# Patient Record
Sex: MALE | Race: WHITE | NOT HISPANIC OR LATINO | Employment: FULL TIME | URBAN - METROPOLITAN AREA
[De-identification: names, ages, dates, MRNs, and addresses within clinical notes are randomized per-mention and may not be internally consistent; named-entity substitution may affect disease eponyms.]

---

## 2023-07-18 ENCOUNTER — OFFICE VISIT (OUTPATIENT)
Dept: URGENT CARE | Facility: CLINIC | Age: 23
End: 2023-07-18
Payer: COMMERCIAL

## 2023-07-18 VITALS
BODY MASS INDEX: 21.66 KG/M2 | RESPIRATION RATE: 16 BRPM | TEMPERATURE: 98.3 F | HEIGHT: 67 IN | WEIGHT: 138 LBS | OXYGEN SATURATION: 96 % | HEART RATE: 85 BPM | DIASTOLIC BLOOD PRESSURE: 62 MMHG | SYSTOLIC BLOOD PRESSURE: 102 MMHG

## 2023-07-18 DIAGNOSIS — Z11.3 SCREEN FOR STD (SEXUALLY TRANSMITTED DISEASE): ICD-10-CM

## 2023-07-18 DIAGNOSIS — N48.9 PENILE LESION: ICD-10-CM

## 2023-07-18 DIAGNOSIS — N48.22 CELLULITIS, PENIS: ICD-10-CM

## 2023-07-18 PROCEDURE — 99204 OFFICE O/P NEW MOD 45 MIN: CPT | Performed by: NURSE PRACTITIONER

## 2023-07-18 PROCEDURE — 3078F DIAST BP <80 MM HG: CPT | Performed by: NURSE PRACTITIONER

## 2023-07-18 PROCEDURE — 3074F SYST BP LT 130 MM HG: CPT | Performed by: NURSE PRACTITIONER

## 2023-07-18 ASSESSMENT — ENCOUNTER SYMPTOMS
CONSTITUTIONAL NEGATIVE: 1
FEVER: 0
CHILLS: 0

## 2023-07-18 ASSESSMENT — VISUAL ACUITY: OU: 1

## 2023-07-18 NOTE — PROGRESS NOTES
Subjective:     Carlos Hart is a 23 y.o. male who presents for Wound Infection (On penis, now has grape size bumps near the area, and a rash.)       Wound Infection  This is a new problem. The problem has been gradually worsening. Associated symptoms include a rash. Pertinent negatives include no chills or fever.     2 weeks ago, patient developed pimple-like lesion with redness, swelling, and tenderness near the tip of his penis. He squeezed it and expressed purulent discharge. Eventually developed an ulceration and scabbing with dried yellow crusting. Improving overall with routine cleansing and topical antibiotic ointment. Was using a dressing to keep it covered, but then noticed red bumps/rash develop around the initial lesion where the adhesive came into contact. Unclear if related to the adhesive. Denies itching or burning. No vesicular lesions. Noted 2 small palpable bumps at his left groin. They are soft and mobile. Denies other symptoms. No dysuria or penile discharge.    Denies concern for STD. Reports monogamous relationship with single female partner who had recent negative STD testing.    Review of Systems   Constitutional: Negative.  Negative for chills, fever and malaise/fatigue.   Genitourinary: Negative.  Negative for dysuria.   Skin:  Positive for rash. Negative for itching.   All other systems reviewed and are negative.    Refer to HPI for additional details.    During this visit, appropriate PPE was worn, and hand hygiene was performed.    PMH:  has no past medical history on file.    MEDS:   Current Outpatient Medications:     mupirocin (BACTROBAN) 2 % Ointment, Apply 1 Application topically 3 times a day for 7 days., Disp: 22 g, Rfl: 0    ALLERGIES: No Known Allergies  SURGHX: History reviewed. No pertinent surgical history.  SOCHX:      FH: Per HPI as applicable/pertinent.      Objective:     /62 (BP Location: Left arm, Patient Position: Sitting, BP Cuff Size: Adult)   Pulse 85   Temp  "36.8 °C (98.3 °F) (Temporal)   Resp 16   Ht 1.702 m (5' 7\")   Wt 62.6 kg (138 lb)   SpO2 96%   BMI 21.61 kg/m²     Physical Exam  Nursing note reviewed.   Constitutional:       General: He is not in acute distress.     Appearance: He is well-developed. He is not ill-appearing or toxic-appearing.   Eyes:      General: Vision grossly intact.   Cardiovascular:      Rate and Rhythm: Normal rate.   Pulmonary:      Effort: Pulmonary effort is normal. No respiratory distress.   Genitourinary:     Penis: Lesions present.           Comments: Open wound/ulceration at left glans penis, with overlying/surrounding dried yellowish crusting; diffuse scattered erythematous papular lesions on glans, no vesicles or discharge  Musculoskeletal:         General: No deformity. Normal range of motion.   Lymphadenopathy:      Lower Body: Left inguinal adenopathy present.   Skin:     General: Skin is warm and dry.      Coloration: Skin is not pale.   Neurological:      Mental Status: He is alert and oriented to person, place, and time.      Motor: No weakness.   Psychiatric:         Behavior: Behavior normal. Behavior is cooperative.       Assessment/Plan:     1. Penile lesion  - Chlamydia/GC, PCR (Urine); Future  - HSV 1/2 IGM; Future  - HSV I SPECIFIC IGG AB; Future  - HSV II SPECIFIC IGG AB; Future  - T.PALLIDUM AB VIN (SCREENING); Future    2. Cellulitis, penis  - mupirocin (BACTROBAN) 2 % Ointment; Apply 1 Application topically 3 times a day for 7 days.  Dispense: 22 g; Refill: 0    3. Screen for STD (sexually transmitted disease)  - Chlamydia/GC, PCR (Urine); Future  - HSV 1/2 IGM; Future  - HSV I SPECIFIC IGG AB; Future  - HSV II SPECIFIC IGG AB; Future  - T.PALLIDUM AB VIN (SCREENING); Future    Rx as above sent electronically. Studies pending. Will contact patient.    Differential diagnosis, natural history, supportive care, over-the-counter symptom management per 's instructions, close monitoring, and indications " for immediate follow-up discussed.     All questions answered. Patient agrees with the plan of care.    Billing note: moderate complexity and moderate risk. New patient. [id number]. Please refer to LOS tool for details.

## 2023-10-08 ENCOUNTER — OFFICE VISIT (OUTPATIENT)
Dept: URGENT CARE | Facility: CLINIC | Age: 23
End: 2023-10-08
Payer: COMMERCIAL

## 2023-10-08 VITALS
WEIGHT: 138 LBS | SYSTOLIC BLOOD PRESSURE: 112 MMHG | TEMPERATURE: 97.3 F | BODY MASS INDEX: 21.66 KG/M2 | HEIGHT: 67 IN | HEART RATE: 106 BPM | OXYGEN SATURATION: 96 % | RESPIRATION RATE: 16 BRPM | DIASTOLIC BLOOD PRESSURE: 60 MMHG

## 2023-10-08 DIAGNOSIS — B36.9 FUNGAL RASH OF TRUNK: ICD-10-CM

## 2023-10-08 PROCEDURE — 99213 OFFICE O/P EST LOW 20 MIN: CPT | Performed by: FAMILY MEDICINE

## 2023-10-08 PROCEDURE — 3078F DIAST BP <80 MM HG: CPT | Performed by: FAMILY MEDICINE

## 2023-10-08 PROCEDURE — 3074F SYST BP LT 130 MM HG: CPT | Performed by: FAMILY MEDICINE

## 2023-10-08 ASSESSMENT — ENCOUNTER SYMPTOMS: FEVER: 0

## 2023-10-08 NOTE — PROGRESS NOTES
"Subjective:     Carlos Hart is a 23 y.o. male who presents for Rash (Not painful or itchy, started on arms 4 days ago. Is now spreading to the rest of the body. )    HPI  Pt presents for evaluation of an acute problem  Patient here for evaluation of rash for the past 4 days  Started on the arms and has been spreading  Has no pain or itching  Tried using hydrocortisone cream on the area without much improvement  Does not feel ill otherwise    Review of Systems   Constitutional:  Negative for fever.   Skin:  Positive for rash.       PMH:  has no past medical history on file.  MEDS:   Current Outpatient Medications:     KETOCONAZOLE, TOPICAL, 1 % Shampoo, Apply to affected area once daily, Disp: 200 mL, Rfl: 0  ALLERGIES: No Known Allergies  SURGHX: History reviewed. No pertinent surgical history.  SOCHX:       Objective:   /60 (BP Location: Left arm, Patient Position: Sitting, BP Cuff Size: Adult)   Pulse (!) 106   Temp 36.3 °C (97.3 °F) (Temporal)   Resp 16   Ht 1.702 m (5' 7\")   Wt 62.6 kg (138 lb)   SpO2 96%   BMI 21.61 kg/m²     Physical Exam  Constitutional:       General: He is not in acute distress.     Appearance: He is well-developed. He is not diaphoretic.   Pulmonary:      Effort: Pulmonary effort is normal.   Neurological:      Mental Status: He is alert.     Multiple areas of erythematous flaky patches which are circular with mildly irregular borders, slightly raised, and with no connecting erythema between lesions.  Patches are throughout the torso and arms.  No larger patch/herald patch    Assessment/Plan:   Assessment    1. Fungal rash of trunk  - KETOCONAZOLE, TOPICAL, 1 % Shampoo; Apply to affected area once daily  Dispense: 200 mL; Refill: 0    Patient with fungal rash.  Given ketoconazole shampoo to be used all over body daily in the shower.  If not resolving in the next 1 to 2 weeks, follow-up in urgent care.    "

## 2025-05-25 ENCOUNTER — OFFICE VISIT (OUTPATIENT)
Dept: URGENT CARE | Facility: CLINIC | Age: 25
End: 2025-05-25
Payer: COMMERCIAL

## 2025-05-25 VITALS
OXYGEN SATURATION: 97 % | RESPIRATION RATE: 16 BRPM | HEART RATE: 120 BPM | WEIGHT: 153.4 LBS | BODY MASS INDEX: 24.08 KG/M2 | DIASTOLIC BLOOD PRESSURE: 78 MMHG | TEMPERATURE: 98.5 F | SYSTOLIC BLOOD PRESSURE: 128 MMHG | HEIGHT: 67 IN

## 2025-05-25 DIAGNOSIS — R59.0 ANTERIOR CERVICAL LYMPHADENOPATHY: Primary | ICD-10-CM

## 2025-05-25 DIAGNOSIS — Z80.7 FAMILY HISTORY OF LYMPHOMA: ICD-10-CM

## 2025-05-25 DIAGNOSIS — J02.9 PHARYNGITIS, UNSPECIFIED ETIOLOGY: ICD-10-CM

## 2025-05-25 LAB
HETEROPH AB SER QL LA: NEGATIVE
POCT INT CON NEG: NEGATIVE
POCT INT CON POS: NEGATIVE
S PYO DNA SPEC NAA+PROBE: NOT DETECTED

## 2025-05-25 PROCEDURE — 87651 STREP A DNA AMP PROBE: CPT | Performed by: FAMILY MEDICINE

## 2025-05-25 PROCEDURE — 86308 HETEROPHILE ANTIBODY SCREEN: CPT | Performed by: FAMILY MEDICINE

## 2025-05-25 PROCEDURE — 3074F SYST BP LT 130 MM HG: CPT | Performed by: FAMILY MEDICINE

## 2025-05-25 PROCEDURE — 3078F DIAST BP <80 MM HG: CPT | Performed by: FAMILY MEDICINE

## 2025-05-25 PROCEDURE — 99214 OFFICE O/P EST MOD 30 MIN: CPT | Performed by: FAMILY MEDICINE

## 2025-05-25 ASSESSMENT — ENCOUNTER SYMPTOMS
VOMITING: 0
COUGH: 0
CHILLS: 0
SHORTNESS OF BREATH: 0
NAUSEA: 0
MYALGIAS: 0
FEVER: 0
DIZZINESS: 0
SWOLLEN GLANDS: 1
EYE REDNESS: 0
EYE DISCHARGE: 0
HOARSE VOICE: 0
SORE THROAT: 0

## 2025-05-25 NOTE — PROGRESS NOTES
Subjective:   Carlos Hart is a 25 y.o. male who presents for Adenopathy (On and off for a year his lymph nodes swell and then go way but for the last month it hasnt)        25-year-old presents urgent care with reports of anterior neck lymph node swelling intermittently over the past year and minimally improving over the past month.  Reports history of chronic and intermittent allergic rhinitis and reports chronic congestion and intermittent sore throat.  Denies fevers chills or sweats.  Reports currently not being established with a primary care provider and is amenable to establishing with a primary care provider.  The patient reports history of lymphoma in father.    Pharyngitis   This is a recurrent problem. The current episode started more than 1 month ago. The problem has been waxing and waning. Associated symptoms include congestion and swollen glands. Pertinent negatives include no coughing, hoarse voice, shortness of breath or vomiting. Exposure to: Community-wide streptococcal pharyngitis exposure noted. Treatments tried: Reports taking no current medication for allergic rhinitis.     PMH:  has no past medical history on file.  MEDS: Current Medications[1]  ALLERGIES: Allergies[2]  SURGHX: Past Surgical History[3]  SOCHX:  reports that he has never smoked. He has never used smokeless tobacco. He reports current alcohol use. He reports current drug use. Drug: Marijuana.  FH: History reviewed. No pertinent family history.  Review of Systems   Constitutional:  Negative for chills and fever.   HENT:  Positive for congestion. Negative for hoarse voice and sore throat.    Eyes:  Negative for discharge and redness.   Respiratory:  Negative for cough and shortness of breath.    Cardiovascular:  Negative for chest pain.   Gastrointestinal:  Negative for nausea and vomiting.   Musculoskeletal:  Negative for myalgias.   Skin:  Negative for rash.   Neurological:  Negative for dizziness.        Objective:   /78   " Pulse (!) 120   Temp 36.9 °C (98.5 °F) (Temporal)   Resp 16   Ht 1.702 m (5' 7\")   Wt 69.6 kg (153 lb 6.4 oz)   SpO2 97%   BMI 24.03 kg/m²   Physical Exam  Vitals and nursing note reviewed.   Constitutional:       General: He is not in acute distress.     Appearance: He is well-developed.   HENT:      Head: Normocephalic and atraumatic.      Right Ear: External ear normal.      Left Ear: External ear normal.      Nose: Rhinorrhea present.      Mouth/Throat:      Mouth: Mucous membranes are moist.      Pharynx: Posterior oropharyngeal erythema present. No oropharyngeal exudate.   Eyes:      Conjunctiva/sclera: Conjunctivae normal.   Cardiovascular:      Rate and Rhythm: Normal rate.   Pulmonary:      Effort: Pulmonary effort is normal. No respiratory distress.      Breath sounds: Normal breath sounds.   Abdominal:      General: There is no distension.   Musculoskeletal:         General: Normal range of motion.   Lymphadenopathy:      Cervical: Cervical adenopathy present.      Right cervical: Superficial cervical adenopathy present.      Left cervical: Superficial cervical adenopathy present.   Skin:     General: Skin is warm and dry.   Neurological:      General: No focal deficit present.      Mental Status: He is alert and oriented to person, place, and time. Mental status is at baseline.      Gait: Gait (gait at baseline) normal.   Psychiatric:         Judgment: Judgment normal.           Assessment/Plan:   1. Anterior cervical lymphadenopathy  - POCT Mononucleosis (mono)  - POCT GROUP A STREP, PCR  - CBC WITH DIFFERENTIAL; Future  - Comp Metabolic Panel; Future    2. Pharyngitis, unspecified etiology  - POCT Mononucleosis (mono)  - POCT GROUP A STREP, PCR    3. Family history of lymphoma  - CBC WITH DIFFERENTIAL; Future  - Comp Metabolic Panel; Future        Medical Decision Making/Course:  In the course of preparing for this visit with review of the pertinent past medical history, recent and past clinic " visits, current medications, and performing chart, immunization, medical history and medication reconciliation, and in the further course of obtaining the current history pertinent to the clinic visit today, performing an exam and evaluation, ordering and independently evaluating labs, tests including group A strep PCR testing and point-of-care mononucleosis testing and CBC and comprehensive metabolic panel which is pending, and/or procedures, prescribing any recommended new medications as noted above, providing any pertinent counseling and education and recommending further coordination of care including recommendations for symptomatic and supportive measures and recommendation and initiation of consultation to establish with primary care provider for recheck reevaluation and consideration of further management, at least  38 minutes of total time were spent during this encounter.      Discussed close monitoring, return precautions, and supportive measures of maintaining adequate fluid hydration and caloric intake, relative rest and symptom management as needed for pain and/or fever.    Differential diagnosis, natural history, supportive care, and indications for immediate follow-up discussed.     Advised the patient to follow-up with the primary care physician for recheck, reevaluation, and consideration of further management.    Please note that this dictation was created using voice recognition software. I have worked with consultants from the vendor as well as technical experts from [facility] [employer] to optimize the interface. I have made every reasonable attempt to correct obvious errors, but I expect that there are errors of grammar and possibly content that I did not discover before finalizing the note.         [1]   Current Outpatient Medications:     KETOCONAZOLE, TOPICAL, 1 % Shampoo, Apply to affected area once daily (Patient not taking: Reported on 5/25/2025), Disp: 200 mL, Rfl: 0  [2]   Allergies  Allergen  Reactions    Pollen Extract    [3] History reviewed. No pertinent surgical history.

## 2025-06-09 ENCOUNTER — HOSPITAL ENCOUNTER (OUTPATIENT)
Dept: LAB | Facility: MEDICAL CENTER | Age: 25
End: 2025-06-09
Attending: FAMILY MEDICINE
Payer: COMMERCIAL